# Patient Record
Sex: FEMALE | Race: BLACK OR AFRICAN AMERICAN
[De-identification: names, ages, dates, MRNs, and addresses within clinical notes are randomized per-mention and may not be internally consistent; named-entity substitution may affect disease eponyms.]

---

## 2019-03-04 ENCOUNTER — HOSPITAL ENCOUNTER (EMERGENCY)
Dept: HOSPITAL 97 - ER | Age: 54
Discharge: HOME | End: 2019-03-04
Payer: COMMERCIAL

## 2019-03-04 DIAGNOSIS — R10.10: Primary | ICD-10-CM

## 2019-03-04 DIAGNOSIS — K21.9: ICD-10-CM

## 2019-03-04 DIAGNOSIS — I10: ICD-10-CM

## 2019-03-04 LAB
ALBUMIN SERPL BCP-MCNC: 4.1 G/DL (ref 3.4–5)
ALP SERPL-CCNC: 108 U/L (ref 45–117)
ALT SERPL W P-5'-P-CCNC: 36 U/L (ref 12–78)
AST SERPL W P-5'-P-CCNC: 18 U/L (ref 15–37)
BUN BLD-MCNC: 10 MG/DL (ref 7–18)
GLUCOSE SERPLBLD-MCNC: 103 MG/DL (ref 74–106)
HCT VFR BLD CALC: 43.3 % (ref 36–45)
LIPASE SERPL-CCNC: 63 U/L (ref 73–393)
LYMPHOCYTES # SPEC AUTO: 4.1 K/UL (ref 0.7–4.9)
PMV BLD: 8.8 FL (ref 7.6–11.3)
POTASSIUM SERPL-SCNC: 3.1 MMOL/L (ref 3.5–5.1)
RBC # BLD: 5.09 M/UL (ref 3.86–4.86)

## 2019-03-04 PROCEDURE — 74177 CT ABD & PELVIS W/CONTRAST: CPT

## 2019-03-04 PROCEDURE — 83690 ASSAY OF LIPASE: CPT

## 2019-03-04 PROCEDURE — 36415 COLL VENOUS BLD VENIPUNCTURE: CPT

## 2019-03-04 PROCEDURE — 99284 EMERGENCY DEPT VISIT MOD MDM: CPT

## 2019-03-04 PROCEDURE — 80076 HEPATIC FUNCTION PANEL: CPT

## 2019-03-04 PROCEDURE — 96374 THER/PROPH/DIAG INJ IV PUSH: CPT

## 2019-03-04 PROCEDURE — 96361 HYDRATE IV INFUSION ADD-ON: CPT

## 2019-03-04 PROCEDURE — 81003 URINALYSIS AUTO W/O SCOPE: CPT

## 2019-03-04 PROCEDURE — 96375 TX/PRO/DX INJ NEW DRUG ADDON: CPT

## 2019-03-04 PROCEDURE — 85025 COMPLETE CBC W/AUTO DIFF WBC: CPT

## 2019-03-04 PROCEDURE — 80048 BASIC METABOLIC PNL TOTAL CA: CPT

## 2019-03-04 NOTE — RAD REPORT
EXAM DESCRIPTION:  CTAbdomen   Pelvis W Contrast - 3/4/2019 4:20 pm

 

CLINICAL HISTORY:  Abdominal pain.

ABD PAIN

 

COMPARISON:  No comparisons

 

TECHNIQUE:  Biphasic CT imaging of the abdomen and pelvis was performed with 100 ml non-ionic IV cont
rast.

 

All CT scans are performed using dose optimization technique as appropriate and may include automated
 exposure control or mA/KV adjustment according to patient size.

 

FINDINGS:  The lung bases are clear.

 

The liver, spleen, pancreas, adrenal glands and kidneys are within normal limits.

 

No bowel obstruction, free air, free fluid or abscess.  The appendix is not identified as a discrete 
structure, however, no secondary findings of appendicitis are identified.   No evidence of significan
t lymphadenopathy.

 

No suspicious bony findings.

 

IMPRESSION:  No acute intra-abdominal or pelvic finding.

## 2019-03-04 NOTE — EDPHYS
Physician Documentation                                                                           

 [facility]                                                                

Name: Grayson Almaguer                                                                               

Age: 53 yrs                                                                                       

Sex: Female                                                                                       

: 1965                                                                                   

MRN: [id number]                                                                                   

Arrival Date: 2019                                                                          

Time: 14:14                                                                                       

Account#: [id number]                                                                            

Bed 25                                                                                            

Private MD:                                                                                       

ED Physician Dakotah Ponce                                                                    

HPI:                                                                                              

                                                                                             

14:58 This 53 yrs old Black Female presents to ER via Ambulatory with complaints of Abdominal ma2 

      Pain.                                                                                       

14:58 The patient presents with abdominal pain. Onset: The symptoms/episode began/occurred    ma2 

      gradually, 2 week(s) ago. Associated signs and symptoms: Pertinent negatives: nausea        

      and vomiting, chest pain, dysuria. The symptoms are described as burning. Severity of       

      pain: At its worst the pain was moderate in the emergency department the pain is            

      unchanged. The patient has experienced similar episodes in the past.                        

                                                                                                  

OB/GYN:                                                                                           

14:31 LMP N/A - Hysterectomy                                                                  aa5 

                                                                                                  

Historical:                                                                                       

- Allergies:                                                                                      

14:31 No Known Allergies;                                                                     aa5 

- Home Meds:                                                                                      

16:07 sucralfate 1 gram Oral tab 1 tab 2 times per day [Active]; ultrabiotin [Active];        rv  

      losartan-hydrochlorothiazide 100-25 mg oral tab 1 tab once daily [Active];                  

- PMHx:                                                                                           

14:31 Hypertension; Acid Reflux;                                                              aa5 

- PSHx:                                                                                           

14:31 nose; Partial hysterectomy;                                                             aa5 

                                                                                                  

- Immunization history:: Flu vaccine is not up to date.                                           

- Social history:: Smoking status: Patient/guardian denies using tobacco,                         

  Patient/guardian denies using alcohol, street drugs, The patient lives with family.             

- Ebola Screening: : No symptoms or risks identified at this time.                                

- Family history:: not pertinent.                                                                 

                                                                                                  

                                                                                                  

ROS:                                                                                              

14:58 Constitutional: Negative for fever, chills, and weight loss, Neck: Negative for injury, ma2 

      pain, and swelling, Cardiovascular: Negative for chest pain, palpitations, and edema,       

      Respiratory: Negative for shortness of breath, cough, wheezing, and pleuritic chest         

      pain, Back: Negative for injury and pain.                                                   

14:58 Abdomen/GI: Positive for abdominal pain, Negative for nausea and vomiting, vomiting,        

      constipation, abdominal distension.                                                         

14:58 All other systems are negative.                                                             

                                                                                                  

Exam:                                                                                             

14:58 Constitutional:  This is a well developed, well nourished patient who is awake, alert,  ma2 

      and in no acute distress. Neck:  Trachea midline, no thyromegaly or masses palpated,        

      and no cervical lymphadenopathy.  Supple, full range of motion without nuchal rigidity,     

      or vertebral point tenderness.  No Meningismus. Chest/axilla:  Normal chest wall            

      appearance and motion.  Nontender with no deformity.  No lesions are appreciated.           

      Cardiovascular:  Regular rate and rhythm with a normal S1 and S2.  No gallops, murmurs,     

      or rubs.  Normal PMI, no JVD.  No pulse deficits. Respiratory:  Lungs have equal breath     

      sounds bilaterally, clear to auscultation and percussion.  No rales, rhonchi or wheezes     

      noted.  No increased work of breathing, no retractions or nasal flaring. Abdomen/GI:        

      Soft, non-tender, with normal bowel sounds.  No distension or tympany.  No guarding or      

      rebound.  No evidence of tenderness throughout. MS/ Extremity:  Pulses equal, no            

      cyanosis.  Neurovascular intact.  Full, normal range of motion. Neuro:  Awake and           

      alert, GCS 15, oriented to person, place, time, and situation.  Cranial nerves II-XII       

      grossly intact.  Motor strength 5/5 in all extremities.  Sensory grossly intact.            

      Cerebellar exam normal.  Normal gait.                                                       

                                                                                                  

Vital Signs:                                                                                      

14:31  / 104; Pulse 96; Resp 18 S; Temp 98.2(TE); Pulse Ox 100% on R/A; Weight 89.81 kg aa5 

      (R); Height 5 ft. 1 in. (154.94 cm) (R); Pain 9/10;                                         

14:52  / 97; Pulse 86; Resp 21; Pulse Ox 100% on R/A;                                   rv  

15:30  / 84; Pulse 76; Resp 17 S; Pulse Ox 100% on R/A;                                 rv  

16:00  / 74 LA; Pulse 68; Resp 17 S; Pulse Ox 100% on R/A;                              rv  

14:31 Body Mass Index 37.41 (89.81 kg, 154.94 cm)                                             aa5 

                                                                                                  

MDM:                                                                                              

14:49 Patient medically screened.                                                             ma2 

14:58 Differential diagnosis: Cholelithiasis, diverticulitis, gastroesophageal reflux         ma2 

      disease, Irritable bowel syndrome.                                                          

16:26 Data reviewed: vital signs, nurses notes. Counseling: I had a detailed discussion with  ma2 

      the patient and/or guardian regarding: the historical points, exam findings, and any        

      diagnostic results supporting the discharge/admit diagnosis, the presence of at least       

      one elevated blood pressure reading (>120/80) during this emergency department visit,       

      the need for outpatient follow up. Response to treatment: the patient's symptoms have       

      resolved after treatment, the patient is not tachycardic.                                   

                                                                                                  

                                                                                             

14:50 Order name: Basic Metabolic Panel; Complete Time: 16:25                                 ma2 

                                                                                             

14:50 Order name: CBC with Diff; Complete Time: 16:25                                         ma2 

                                                                                             

14:50 Order name: Creatinine for Radiology; Complete Time: 16:25                              ma2 

                                                                                             

14:50 Order name: Hepatic Function; Complete Time: 16:25                                      ma2 

                                                                                             

14:50 Order name: Lipase; Complete Time: 16:25                                                ma2 

                                                                                             

15:47 Order name: Urine Dipstick--Ancillary (enter results)                                   bd  

                                                                                             

14:50 Order name: IV Saline Lock; Complete Time: 15:41                                        ma2 

                                                                                             

14:50 Order name: Labs collected and sent; Complete Time: 15:41                               ma2 

                                                                                             

14:58 Order name: CT Abd/Pelvis - W/Contrast; Complete Time: 16:48                            ma2 

                                                                                             

14:58 Order name: Urine Dipstick-Ancillary (obtain specimen); Complete Time: 15:41            ma2 

                                                                                                  

Administered Medications:                                                                         

15:20 Drug: NS 0.9% 1000 ml Route: IV; Rate: 1 bolus; Site: right antecubital;                rv  

16:30 Follow up: IV Status: Completed infusion                                                rv  

15:20 Drug: Pepcid 20 mg Route: IVP; Site: right antecubital;                                 rv  

16:30 Follow up: Response: Pain is decreased                                                  rv  

15:20 Drug: morphine 4 mg Route: IVP; Site: right antecubital;                                rv  

16:30 Follow up: Response: Pain is decreased                                                  rv  

15:20 Drug: Zofran 4 mg Route: IVP; Site: right antecubital;                                  rv  

16:30 Follow up: Response: Nausea is decreased                                                rv  

15:20 Drug: GI Cocktail without Donnatal - (Maalox Suspension 30 ml, Lidocaine Liquid 2 % 15  rv  

      ml) Route: PO;                                                                              

16:30 Follow up: Response: Pain is decreased                                                  rv  

                                                                                                  

                                                                                                  

Disposition:                                                                                      

19 16:28 Discharged to Home. Impression: Pain localized to upper abdomen.                   

- Condition is Stable.                                                                            

- Discharge Instructions: Abdominal Pain, Adult.                                                  

- Prescriptions for Tylenol- Codeine #3 300-30 mg Oral Tablet - take 2 tablet by ORAL             

  route every 6 hours As needed; 30 tablet. Zofran 4 mg Oral Tablet - take 1 tablet by            

  ORAL route every 12 hours As needed; 20 tablet. Pepcid 20 mg Oral Tablet - take 1               

  tablet by ORAL route once daily for 10 days; 10 tablet.                                         

- Medication Reconciliation Form, Thank You Letter, Antibiotic Education, Prescription            

  Opioid Use form.                                                                                

- Follow up: Private Physician; When: Tomorrow; Reason: Continuance of care.                      

                                                                                                  

                                                                                                  

                                                                                                  

Signatures:                                                                                       

Dispatcher [employer]                           EDMS                                                 

Marilyn Garcia RN                     RN   aa5                                                  

Dakotah Ponce MD MD   ma2                                                  

Thai George RN                    RN   rv                                                   

                                                                                                  

Corrections: (The following items were deleted from the chart)                                    

16:08 14:31 Home Meds: None; aa5                                                              rv  

16:59 16:28 2019 16:28 Discharged to Home. Impression: Pain localized to upper abdomen. rv  

      Condition is Stable. Forms are Medication Reconciliation Form, Thank You Letter,            

      Antibiotic Education, Prescription Opioid Use. Follow up: Private Physician; When:          

      Tomorrow; Reason: Continuance of care. ma2                                                  

                                                                                                  

**************************************************************************************************

## 2019-03-04 NOTE — ER
Nurse's Notes                                                                                     

 [facility]                                                                

Name: Grayson Almaguer                                                                               

Age: 53 yrs                                                                                       

Sex: Female                                                                                       

: 1965                                                                                   

MRN: [id number]                                                                                   

Arrival Date: 2019                                                                          

Time: 14:14                                                                                       

Account#: [id number]                                                                            

Bed 25                                                                                            

Private MD:                                                                                       

Diagnosis: Pain localized to upper abdomen                                                        

                                                                                                  

Presentation:                                                                                     

                                                                                             

14:29 Presenting complaint: Patient states: LUQ pain that began 3 week ago. Pt reports nausea aa5 

      and vomiting. Pt denies diarrhea, reports loose stools.                                     

14:30 Transition of care: patient was not received from another setting of care. Onset of     aa5 

      symptoms was 2019. Risk Assessment: Do you want to hurt yourself or someone        

      else? Patient reports no desire to harm self or others. Initial Sepsis Screen: Does the     

      patient meet any 2 criteria? No. Patient's initial sepsis screen is negative. Does the      

      patient have a suspected source of infection? No. Patient's initial sepsis screen is        

      negative. Care prior to arrival: None.                                                      

14:30 Method Of Arrival: Ambulatory                                                           aa5 

14:30 Acuity: SHAYNA 3                                                                           aa5 

                                                                                                  

OB/GYN:                                                                                           

14:31 LMP N/A - Hysterectomy                                                                  aa5 

                                                                                                  

Historical:                                                                                       

- Allergies:                                                                                      

14:31 No Known Allergies;                                                                     aa5 

- Home Meds:                                                                                      

16:07 sucralfate 1 gram Oral tab 1 tab 2 times per day [Active]; ultrabiotin [Active];        rv  

      losartan-hydrochlorothiazide 100-25 mg oral tab 1 tab once daily [Active];                  

- PMHx:                                                                                           

14:31 Hypertension; Acid Reflux;                                                              aa5 

- PSHx:                                                                                           

14:31 nose; Partial hysterectomy;                                                             aa5 

                                                                                                  

- Immunization history:: Flu vaccine is not up to date.                                           

- Social history:: Smoking status: Patient/guardian denies using tobacco,                         

  Patient/guardian denies using alcohol, street drugs, The patient lives with family.             

- Ebola Screening: : No symptoms or risks identified at this time.                                

- Family history:: not pertinent.                                                                 

                                                                                                  

                                                                                                  

Screening:                                                                                        

15:43 Abuse screen: Denies threats or abuse. Denies injuries from another. Nutritional        rv  

      screening: No deficits noted. Tuberculosis screening: No symptoms or risk factors           

      identified. Fall Risk None identified.                                                      

                                                                                                  

Assessment:                                                                                       

15:42 General: Appears in no apparent distress. uncomfortable, Behavior is calm, cooperative. rv  

      Pain: Complains of pain in abdomen, left side. Neuro: Level of Consciousness is awake,      

      alert, obeys commands, Oriented to person, place, time, situation. Cardiovascular:          

      Capillary refill < 3 seconds. Respiratory: Airway is compromised. GI: Bowel sounds          

      present X 4 quads. Abd is soft Abdomen is tender to palpation in left upper quadrant.       

      : No signs and/or symptoms were reported regarding the genitourinary system. EENT: No     

      signs and/or symptoms were reported regarding the EENT system. Derm: Skin is intact.        

      Musculoskeletal: No signs and/or symptoms reported regarding the musculoskeletal system.    

                                                                                                  

Vital Signs:                                                                                      

14:31  / 104; Pulse 96; Resp 18 S; Temp 98.2(TE); Pulse Ox 100% on R/A; Weight 89.81 kg aa5 

      (R); Height 5 ft. 1 in. (154.94 cm) (R); Pain 9/10;                                         

14:52  / 97; Pulse 86; Resp 21; Pulse Ox 100% on R/A;                                   rv  

15:30  / 84; Pulse 76; Resp 17 S; Pulse Ox 100% on R/A;                                 rv  

16:00  / 74 LA; Pulse 68; Resp 17 S; Pulse Ox 100% on R/A;                              rv  

14:31 Body Mass Index 37.41 (89.81 kg, 154.94 cm)                                             aa5 

                                                                                                  

ED Course:                                                                                        

14:14 Patient arrived in ED.                                                                  mr  

14:29 Arm band placed on.                                                                     aa5 

14:30 Triage completed.                                                                       aa5 

14:49 Dakotah Ponce MD is Attending Physician.                                           ma2 

15:19 Radiology exam delayed due to lab results not completed at this time. (BUN/Creatinine). vm2 

15:25 Radiology exam delayed due to lab results not completed at this time. (BUN/Creatinine). vm2 

15:33 Patient has correct armband on for positive identification. Placed in gown. Bed in low  tl3 

      position. Call light in reach. Side rails up X2. Pulse ox on. NIBP on. Warm blanket         

      given.                                                                                      

15:33 Initial lab(s) drawn, by me, sent to lab. Inserted saline lock: 22 gauge in right       tl3 

      antecubital area, using aseptic technique. Blood collected.                                 

15:43 Missed attempt(s): 22 gauge in left in right forearm.                                   rv  

16:08 Patient moved to CT via wheelchair.                                                     vm2 

16:19 CT completed. Patient tolerated procedure well. Patient moved back from CT.             nj  

16:20 CT Abd/Pelvis - W/Contrast In Process Unspecified.                                      EDMS

16:59 No provider procedures requiring assistance completed. IV discontinued, bleeding        rv  

      controlled, No redness/swelling at site. Pressure dressing applied.                         

                                                                                                  

Administered Medications:                                                                         

15:20 Drug: NS 0.9% 1000 ml Route: IV; Rate: 1 bolus; Site: right antecubital;                rv  

16:30 Follow up: IV Status: Completed infusion                                                rv  

15:20 Drug: Pepcid 20 mg Route: IVP; Site: right antecubital;                                 rv  

16:30 Follow up: Response: Pain is decreased                                                  rv  

15:20 Drug: morphine 4 mg Route: IVP; Site: right antecubital;                                rv  

16:30 Follow up: Response: Pain is decreased                                                  rv  

15:20 Drug: Zofran 4 mg Route: IVP; Site: right antecubital;                                  rv  

16:30 Follow up: Response: Nausea is decreased                                                rv  

15:20 Drug: GI Cocktail without Donnatal - (Maalox Suspension 30 ml, Lidocaine Liquid 2 % 15  rv  

      ml) Route: PO;                                                                              

16:30 Follow up: Response: Pain is decreased                                                  rv  

                                                                                                  

                                                                                                  

Outcome:                                                                                          

16:28 Discharge ordered by MD. abebe 

16:59 Discharged to home ambulatory.                                                          rv  

16:59 Condition: good                                                                             

16:59 Discharge instructions given to patient, Instructed on discharge instructions, follow       

      up and referral plans. medication usage, Demonstrated understanding of instructions,        

      follow-up care, medications, Prescriptions given X 3.                                       

16:59 Patient left the ED.                                                                    rv  

                                                                                                  

Signatures:                                                                                       

Dispatcher [employer]                           [facility]                                                 

Mehul Sharda GarciaMarilyn RN                     RN   aa5                                                  

Ricardo Avalos Victoria vm2 Alzahri, Mohammad, MD MD ma2 Lowrey, Tammy, RN RN   tl3                                                  

Thai George RN                    RN   rv                                                   

                                                                                                  

Corrections: (The following items were deleted from the chart)                                    

16:08 14:31 Home Meds: None; aa5                                                              rv  

                                                                                                  

**************************************************************************************************